# Patient Record
Sex: MALE | Race: OTHER | HISPANIC OR LATINO | Employment: FULL TIME | URBAN - METROPOLITAN AREA
[De-identification: names, ages, dates, MRNs, and addresses within clinical notes are randomized per-mention and may not be internally consistent; named-entity substitution may affect disease eponyms.]

---

## 2022-05-03 ENCOUNTER — OFFICE VISIT (OUTPATIENT)
Dept: GASTROENTEROLOGY | Facility: CLINIC | Age: 47
End: 2022-05-03
Payer: COMMERCIAL

## 2022-05-03 ENCOUNTER — TELEPHONE (OUTPATIENT)
Dept: GASTROENTEROLOGY | Facility: CLINIC | Age: 47
End: 2022-05-03

## 2022-05-03 VITALS
SYSTOLIC BLOOD PRESSURE: 122 MMHG | DIASTOLIC BLOOD PRESSURE: 78 MMHG | BODY MASS INDEX: 25.55 KG/M2 | WEIGHT: 159 LBS | HEIGHT: 66 IN | HEART RATE: 96 BPM

## 2022-05-03 DIAGNOSIS — Z87.19 HISTORY OF REPAIR OF HIATAL HERNIA: Primary | ICD-10-CM

## 2022-05-03 DIAGNOSIS — Z12.11 SCREENING FOR COLON CANCER: ICD-10-CM

## 2022-05-03 DIAGNOSIS — Z98.890 HISTORY OF REPAIR OF HIATAL HERNIA: Primary | ICD-10-CM

## 2022-05-03 PROCEDURE — 99213 OFFICE O/P EST LOW 20 MIN: CPT | Performed by: REGISTERED NURSE

## 2022-05-03 RX ORDER — FEXOFENADINE HCL 180 MG/1
180 TABLET ORAL DAILY
COMMUNITY

## 2022-05-03 NOTE — PROGRESS NOTES
[address] [facility] - Outpatient Consultation  Sandra Myers 49 y o  male MRN: [id number]  Encounter: [id number]    ASSESSMENT AND PLAN:      1  History of repair of hiatal hernia  Status post gastric fundal application 50/18/0258  Recovering nicely  He is already weaned off of his Nexium  He is eating multiple small meals daily  Slight bloating however he will take an Khloe-[place] or Gas-X and it will resolve  Denies any acid reflux, nausea or vomiting  2  Screening for colon cancer  No prior colonoscopy  Average risk for colon neoplasm    - Colonoscopy; Future  - Sod Picosulfate-Mag Ox-Cit Acd 10-3 5-12 MG-GM -GM/160ML SOLN; Take 160 mL by mouth once for 1 dose Take 160 mL by mouth once for 1 dose  Dispense: 320 mL; Refill: 0      Followup Appointment:  2 months  ______________________________________________________________________    Chief Complaint   Patient presents with    stomach not feeling well with eating       HPI:   Sandra Myers is a 49y o  year old male who presents for follow-up after hiatal hernia surgery  He is status post gastric fundal application for hiatal hernia chronic reflux  Surgery was on about 02/03/2022  Patient had a surgery done in [place]  He was on Nexium twice daily and then wean himself off and is no longer taking  He is eating multiple small meals daily  He does admit to intermittent bloating at times however this is much improved even from a month ago  He seems to be recovering well  He denies any nausea or vomiting  He has lost about 10 lb however he is eating a significant amount less than what he was  Denies any changes in bowel habits any blood in the stools      Historical Information   Past Medical History:   Diagnosis Date    Allergies     GERD (gastroesophageal reflux disease)      Past Surgical History:   Procedure Laterality Date    HERNIA REPAIR      UPPER GASTROINTESTINAL ENDOSCOPY       Social History Substance and Sexual Activity   Alcohol Use Not Currently     Social History     Substance and Sexual Activity   Drug Use Not on file     Social History     Tobacco Use   Smoking Status Never Smoker   Smokeless Tobacco Never Used     Family History   Problem Relation Age of Onset    Colon polyps Neg Hx     Colon cancer Neg Hx        Meds/Allergies     Current Outpatient Medications:     fexofenadine (ALLEGRA) 180 MG tablet    Sod Picosulfate-Mag Ox-Cit Acd 10-3 5-12 MG-GM -GM/160ML SOLN    Allergies   Allergen Reactions    Penicillins Rash       PHYSICAL EXAM:    Blood pressure 122/78, pulse 96, height 5' 6" (1 676 m), weight 72 1 kg (159 lb)  Body mass index is 25 66 kg/m²  General Appearance: NAD, cooperative, alert  Eyes: Anicteric, PERRLA, EOMI  ENT:  Normocephalic, atraumatic, normal mucosa  Neck:  Supple, symmetrical, trachea midline,   Resp:  Clear to auscultation bilaterally; no rales, rhonchi or wheezing; respirations unlabored   CV:  S1 S2, Regular rate and rhythm; no murmur, rub, or gallop  GI:  Soft, non-tender, non-distended; normal bowel sounds; no masses, no organomegaly   Rectal: Deferred  Musculoskeletal: No cyanosis, clubbing or edema  Normal ROM  Skin:  No jaundice, rashes, or lesions   Heme/Lymph: No palpable cervical lymphadenopathy  Psych: Normal affect, good eye contact  Neuro: No gross deficits, AAOx3    Lab Results:   No results found for: WBC, HGB, HCT, MCV, PLT  No results found for: NA, K, CL, CO2, ANIONGAP, BUN, CREATININE, GLUCOSE, GLUF, CALCIUM, CORRECTEDCA, AST, ALT, ALKPHOS, PROT, BILITOT, EGFR  No results found for: IRON, TIBC, FERRITIN  No results found for: LIPASE    Radiology Results:   No results found  REVIEW OF SYSTEMS:    CONSTITUTIONAL: Denies any fever, chills, rigors, and weight loss  HEENT: No earache or tinnitus  Denies hearing loss or visual disturbances  CARDIOVASCULAR: No chest pain or palpitations     RESPIRATORY: Denies any cough, hemoptysis, shortness of breath or dyspnea on exertion  GASTROINTESTINAL: As noted in the History of Present Illness  GENITOURINARY: No problems with urination  Denies any hematuria or dysuria  NEUROLOGIC: No dizziness or vertigo, denies headaches  MUSCULOSKELETAL: Denies any muscle or joint pain  SKIN: Denies skin rashes or itching  ENDOCRINE: Denies excessive thirst  Denies intolerance to heat or cold  PSYCHOSOCIAL: Denies depression or anxiety  Denies any recent memory loss

## 2022-05-03 NOTE — TELEPHONE ENCOUNTER
Patient will call back to schedule colon  He is calling back with new insurance information  I went over assessment with patient and gave him a colon packet with instructions   georges

## 2022-05-26 NOTE — TELEPHONE ENCOUNTER
Brianna has been contacted to schedule colon ordered from last ov with no response-please advise   Thank you

## 2022-11-26 ENCOUNTER — APPOINTMENT (EMERGENCY)
Dept: RADIOLOGY | Facility: HOSPITAL | Age: 47
End: 2022-11-26

## 2022-11-26 ENCOUNTER — HOSPITAL ENCOUNTER (EMERGENCY)
Facility: HOSPITAL | Age: 47
Discharge: HOME | End: 2022-11-26
Attending: EMERGENCY MEDICINE

## 2022-11-26 VITALS
HEIGHT: 66 IN | BODY MASS INDEX: 25.55 KG/M2 | DIASTOLIC BLOOD PRESSURE: 75 MMHG | SYSTOLIC BLOOD PRESSURE: 136 MMHG | RESPIRATION RATE: 24 BRPM | WEIGHT: 159 LBS | TEMPERATURE: 97.4 F | HEART RATE: 72 BPM | OXYGEN SATURATION: 98 %

## 2022-11-26 DIAGNOSIS — Z98.890 STATUS POST NISSEN FUNDOPLICATION: ICD-10-CM

## 2022-11-26 DIAGNOSIS — R00.2 PALPITATIONS: ICD-10-CM

## 2022-11-26 DIAGNOSIS — R07.89 ATYPICAL CHEST PAIN: Primary | ICD-10-CM

## 2022-11-26 LAB
2HR DELTA HS TROPONIN: 0 NG/L
ANION GAP SERPL CALCULATED.3IONS-SCNC: 9 MMOL/L (ref 4–13)
ATRIAL RATE: 75 BPM
BASOPHILS # BLD AUTO: 0.04 THOUSANDS/ÂΜL (ref 0–0.1)
BASOPHILS NFR BLD AUTO: 1 % (ref 0–1)
BUN SERPL-MCNC: 14 MG/DL (ref 5–25)
CALCIUM SERPL-MCNC: 9 MG/DL (ref 8.3–10.1)
CARDIAC TROPONIN I PNL SERPL HS: 3 NG/L
CARDIAC TROPONIN I PNL SERPL HS: 3 NG/L
CHLORIDE SERPL-SCNC: 105 MMOL/L (ref 96–108)
CO2 SERPL-SCNC: 27 MMOL/L (ref 21–32)
CREAT SERPL-MCNC: 0.77 MG/DL (ref 0.6–1.3)
EOSINOPHIL # BLD AUTO: 0.1 THOUSAND/ÂΜL (ref 0–0.61)
EOSINOPHIL NFR BLD AUTO: 2 % (ref 0–6)
ERYTHROCYTE [DISTWIDTH] IN BLOOD BY AUTOMATED COUNT: 11.9 % (ref 11.6–15.1)
GFR SERPL CREATININE-BSD FRML MDRD: 107 ML/MIN/1.73SQ M
GLUCOSE SERPL-MCNC: 94 MG/DL (ref 65–140)
HCT VFR BLD AUTO: 47 % (ref 36.5–49.3)
HGB BLD-MCNC: 15.7 G/DL (ref 12–17)
IMM GRANULOCYTES # BLD AUTO: 0.02 THOUSAND/UL (ref 0–0.2)
IMM GRANULOCYTES NFR BLD AUTO: 0 % (ref 0–2)
LYMPHOCYTES # BLD AUTO: 1.9 THOUSANDS/ÂΜL (ref 0.6–4.47)
LYMPHOCYTES NFR BLD AUTO: 38 % (ref 14–44)
MCH RBC QN AUTO: 30.1 PG (ref 26.8–34.3)
MCHC RBC AUTO-ENTMCNC: 33.4 G/DL (ref 31.4–37.4)
MCV RBC AUTO: 90 FL (ref 82–98)
MONOCYTES # BLD AUTO: 0.37 THOUSAND/ÂΜL (ref 0.17–1.22)
MONOCYTES NFR BLD AUTO: 8 % (ref 4–12)
NEUTROPHILS # BLD AUTO: 2.53 THOUSANDS/ÂΜL (ref 1.85–7.62)
NEUTS SEG NFR BLD AUTO: 51 % (ref 43–75)
NRBC BLD AUTO-RTO: 0 /100 WBCS
P AXIS: 42 DEGREES
PLATELET # BLD AUTO: 252 THOUSANDS/UL (ref 149–390)
PMV BLD AUTO: 9.2 FL (ref 8.9–12.7)
POTASSIUM SERPL-SCNC: 3.8 MMOL/L (ref 3.5–5.3)
PR INTERVAL: 134 MS
QRS AXIS: 44 DEGREES
QRSD INTERVAL: 88 MS
QT INTERVAL: 382 MS
QTC INTERVAL: 426 MS
RBC # BLD AUTO: 5.21 MILLION/UL (ref 3.88–5.62)
SODIUM SERPL-SCNC: 141 MMOL/L (ref 135–147)
T WAVE AXIS: 41 DEGREES
VENTRICULAR RATE: 75 BPM
WBC # BLD AUTO: 4.96 THOUSAND/UL (ref 4.31–10.16)

## 2022-11-26 RX ORDER — CALCIUM CARBONATE 200(500)MG
500 TABLET,CHEWABLE ORAL DAILY PRN
Status: DISCONTINUED | OUTPATIENT
Start: 2022-11-26 | End: 2022-11-26 | Stop reason: HOSPADM

## 2022-11-26 RX ORDER — FAMOTIDINE 20 MG/1
20 TABLET, FILM COATED ORAL ONCE
Status: COMPLETED | OUTPATIENT
Start: 2022-11-26 | End: 2022-11-26

## 2022-11-26 RX ORDER — MAGNESIUM HYDROXIDE/ALUMINUM HYDROXICE/SIMETHICONE 120; 1200; 1200 MG/30ML; MG/30ML; MG/30ML
30 SUSPENSION ORAL ONCE
Status: COMPLETED | OUTPATIENT
Start: 2022-11-26 | End: 2022-11-26

## 2022-11-26 RX ORDER — LIDOCAINE HYDROCHLORIDE 20 MG/ML
15 SOLUTION OROPHARYNGEAL ONCE
Status: COMPLETED | OUTPATIENT
Start: 2022-11-26 | End: 2022-11-26

## 2022-11-26 RX ADMIN — CALCIUM CARBONATE (ANTACID) CHEW TAB 500 MG 500 MG: 500 CHEW TAB at 09:30

## 2022-11-26 RX ADMIN — FAMOTIDINE 20 MG: 20 TABLET, FILM COATED ORAL at 09:30

## 2022-11-26 RX ADMIN — LIDOCAINE HYDROCHLORIDE 15 ML: 20 SOLUTION ORAL; TOPICAL at 09:30

## 2022-11-26 RX ADMIN — ALUMINUM HYDROXIDE, MAGNESIUM HYDROXIDE, AND SIMETHICONE 30 ML: 200; 200; 20 SUSPENSION ORAL at 09:30

## 2022-11-26 NOTE — ED PROVIDER NOTES
History  Chief Complaint   Patient presents with   • Chest Pain     Patient presents to ED with "weight" sensation on his chest that began around 0700 this morning  Patient reports increased bloating after eating, worse than usual       48-year-old male with no significant past medical history, GERD, hiatal hernia repair approximately 8 months ago presents with chest heaviness which he states started approximately 07:00  Also has some mild epigastric discomfort the patient reports has been going on since a recent bout salmonella diarrhea approximately 2 weeks ago  However, he did have a nissen fundoplication performed in [place] approx 8 months ago and states that he also has been having sharp epigastric pain since that time  He believes the epigastric discomfort is somewhat residual from both of these things but also uncertain if this is representative of palpitations  He also notes is feeling heaviness moving up from is lower extremities to his torso but no associated weakness  Associates this with generalized malaise  No fevers  Nausea but no vomiting and no diarrhea  No recent exposures to illnesses  Family history of cardiac disease in both parents  Prior to Admission Medications   Prescriptions Last Dose Informant Patient Reported? Taking?   fexofenadine (ALLEGRA) 180 MG tablet   Yes No   Sig: Take 180 mg by mouth daily      Facility-Administered Medications: None       Past Medical History:   Diagnosis Date   • Allergies    • GERD (gastroesophageal reflux disease)        Past Surgical History:   Procedure Laterality Date   • HERNIA REPAIR     • UPPER GASTROINTESTINAL ENDOSCOPY         Family History   Problem Relation Age of Onset   • Colon polyps Neg Hx    • Colon cancer Neg Hx      I have reviewed and agree with the history as documented      E-Cigarette/Vaping   • E-Cigarette Use Never User      E-Cigarette/Vaping Substances   • Nicotine No    • THC No    • CBD No • Flavoring No    • Other No    • Unknown No      Social History     Tobacco Use   • Smoking status: Never   • Smokeless tobacco: Never   Vaping Use   • Vaping Use: Never used   Substance Use Topics   • Alcohol use: Not Currently       Review of Systems   Constitutional: Positive for chills and fatigue  Negative for diaphoresis and fever  Respiratory: Positive for chest tightness  Negative for wheezing and stridor  Cardiovascular: Negative for chest pain  Gastrointestinal: Negative for abdominal distention, abdominal pain, anal bleeding, diarrhea, nausea, rectal pain and vomiting  Endocrine: Positive for cold intolerance  Physical Exam  Physical Exam  Vitals (Afebrile and 99% on room air) and nursing note reviewed  Constitutional:       General: He is not in acute distress  Appearance: He is well-developed  HENT:      Head: Normocephalic and atraumatic  Eyes:      Conjunctiva/sclera: Conjunctivae normal    Cardiovascular:      Rate and Rhythm: Normal rate and regular rhythm  Heart sounds: No murmur heard  Pulmonary:      Effort: Pulmonary effort is normal  No respiratory distress  Breath sounds: Normal breath sounds  Abdominal:      Palpations: Abdomen is soft  Tenderness: There is no abdominal tenderness  There is no guarding or rebound  Negative signs include McBurney's sign and psoas sign  Comments: Healed abdominal laparoscopy scars   Musculoskeletal:         General: No swelling  Cervical back: Neck supple  Skin:     General: Skin is warm and dry  Capillary Refill: Capillary refill takes less than 2 seconds  Neurological:      Mental Status: He is alert     Psychiatric:         Mood and Affect: Mood normal          Vital Signs  ED Triage Vitals   Temperature Pulse Respirations Blood Pressure SpO2   11/26/22 0802 11/26/22 0802 11/26/22 0802 11/26/22 0804 11/26/22 0802   (!) 97 4 °F (36 3 °C) 71 18 136/75 99 %      Temp Source Heart Rate Source Patient Position - Orthostatic VS BP Location FiO2 (%)   11/26/22 0802 11/26/22 0802 11/26/22 0802 11/26/22 0802 --   Temporal Monitor Lying Left arm       Pain Score       --                  Vitals:    11/26/22 0804 11/26/22 0830 11/26/22 0900 11/26/22 0915   BP: 136/75      Pulse:  68 62 72   Patient Position - Orthostatic VS:             Visual Acuity      ED Medications  Medications   aluminum-magnesium hydroxide-simethicone (MYLANTA) oral suspension 30 mL (30 mL Oral Given 11/26/22 0930)   famotidine (PEPCID) tablet 20 mg (20 mg Oral Given 11/26/22 0930)   Lidocaine Viscous HCl (XYLOCAINE) 2 % mucosal solution 15 mL (15 mL Swish & Swallow Given 11/26/22 0930)       Diagnostic Studies  Results Reviewed     Procedure Component Value Units Date/Time    HS Troponin I 2hr [[id number]]  (Normal) Collected: 11/26/22 1016    Lab Status: Final result Specimen: Blood from Arm, Right Updated: 11/26/22 1109     hs TnI 2hr 3 ng/L      Delta 2hr hsTnI 0 ng/L     FLU/COVID - if FLU clinically relevant [[id number]] Collected: 11/26/22 0918    Lab Status:  In process Specimen: Nares from Nose Updated: 11/26/22 0920    HS Troponin 0hr (reflex protocol) [[id number]]  (Normal) Collected: 11/26/22 0812    Lab Status: Final result Specimen: Blood from Arm, Right Updated: 11/26/22 0849     hs TnI 0hr 3 ng/L     Basic metabolic panel [[id number]] Collected: 11/26/22 0812    Lab Status: Final result Specimen: Blood from Arm, Right Updated: 11/26/22 0836     Sodium 141 mmol/L      Potassium 3 8 mmol/L      Chloride 105 mmol/L      CO2 27 mmol/L      ANION GAP 9 mmol/L      BUN 14 mg/dL      Creatinine 0 77 mg/dL      Glucose 94 mg/dL      Calcium 9 0 mg/dL      eGFR 107 ml/min/1 73sq m     Narrative:      [place] guidelines for Chronic Kidney Disease (CKD):   •  Stage 1 with normal or high GFR (GFR > 90 mL/min/1 73 square meters)  •  Stage 2 Mild CKD (GFR = 60-89 mL/min/1 73 square meters)  •  Stage 3A Moderate CKD (GFR = 45-59 mL/min/1 73 square meters)  •  Stage 3B Moderate CKD (GFR = 30-44 mL/min/1 73 square meters)  •  Stage 4 Severe CKD (GFR = 15-29 mL/min/1 73 square meters)  •  Stage 5 End Stage CKD (GFR <15 mL/min/1 73 square meters)  Note: GFR calculation is accurate only with a steady state creatinine    CBC and differential [[id number]] Collected: 11/26/22 0812    Lab Status: Final result Specimen: Blood from Arm, Right Updated: 11/26/22 0825     WBC 4 96 Thousand/uL      RBC 5 21 Million/uL      Hemoglobin 15 7 g/dL      Hematocrit 47 0 %      MCV 90 fL      MCH 30 1 pg      MCHC 33 4 g/dL      RDW 11 9 %      MPV 9 2 fL      Platelets 889 Thousands/uL      nRBC 0 /100 WBCs      Neutrophils Relative 51 %      Immat GRANS % 0 %      Lymphocytes Relative 38 %      Monocytes Relative 8 %      Eosinophils Relative 2 %      Basophils Relative 1 %      Neutrophils Absolute 2 53 Thousands/µL      Immature Grans Absolute 0 02 Thousand/uL      Lymphocytes Absolute 1 90 Thousands/µL      Monocytes Absolute 0 37 Thousand/µL      Eosinophils Absolute 0 10 Thousand/µL      Basophils Absolute 0 04 Thousands/µL                  X-ray chest 1 view portable   Final Result by Jabari Byrne MD (11/26 2454)      No acute cardiopulmonary disease                    Workstation performed: [id number]                    Procedures  ECG 12 Lead Documentation Only    Date/Time: 11/26/2022 8:03 AM  Performed by: Ada Briscoe PA-C  Authorized by: Ada Briscoe PA-C     Indications / Diagnosis:  Chest pain  ECG reviewed by me, the ED Provider: yes    Patient location:  ED  Previous ECG:     Comparison to cardiac monitor: Yes    Interpretation:     Interpretation: normal    Rate:     ECG rate:  75    ECG rate assessment: normal    Rhythm:     Rhythm: sinus rhythm    Ectopy:     Ectopy: none    QRS:     QRS axis:  Normal  Conduction:     Conduction: normal    ST segments:     ST segments:  Normal  T waves:     T waves: normal               ED Course  ED Course as of 11/26/22 1635   Sat Nov 26, 2022   0369 Workup initiated   0825 EKG and chest x-ray normal   0909 Initial troponin less than 5  Consideration for symptoms started 07:00 will proceed with 2 hour troponin at 08:49 and if negative will discharge to home  1106 2nd trop still in process  Pt pain free     1120 Did discuss results with the patient with negative delta and low value HS troponins  Pt agreeable to plan for OP follow up w/ both cardiology and gastroenterology for respective workups for possible palpitations v  Possible gastric etiology from prior nissen fundoplication and for further need for evaluation w/ EGD  MDM  Number of Diagnoses or Management Options  Atypical chest pain: new and requires workup  Palpitations: new and requires workup  Status post Nissen fundoplication: established and improving     Amount and/or Complexity of Data Reviewed  Clinical lab tests: ordered and reviewed  Tests in the radiology section of CPT®: ordered and reviewed      Discussed return emergency department for any newly developing or worsening signs or symptoms  Patient understood all instructions prior to discharge and plan agreed upon by patient and myself  Disposition  Final diagnoses:   Atypical chest pain   Palpitations   Status post Nissen fundoplication     Time reflects when diagnosis was documented in both MDM as applicable and the Disposition within this note     Time User Action Codes Description Comment    11/26/2022  8:26 AM Osito Becket Add [R07 89] Atypical chest pain     11/26/2022 11:25 AM Osito Becket Add [R00 2] Palpitations     11/26/2022 11:25 AM Osito Guzmanet Add [L59 733] Status post [facility] fundoplication       ED Disposition     ED Disposition   Discharge    Condition   Stable    Date/Time   Sat Nov 26, 2022 11:24 AM    Comment   Momo Houser discharge to home/self care  Follow-up Information     Follow up With Specialties Details Why Contact Info Additional 450 Alexander Calabrese DO Internal Medicine Call today Call to schedule follow-up appointment [address]       Efrem Samayoa MD Cardiology, Multidisciplinary Call today Call schedule follow-up 611 [facility] [phone] Oc Newton MD Gastroenterology Call today Call schedule follow-up appointment for status post [facility] fundoplication 53 [facility] 30  [address] Emergency Department Emergency Medicine Go to  If symptoms worsen [address] [address]  [phone] [address] Emergency Department, [address] [place], [facility]          Discharge Medication List as of 11/26/2022 11:28 AM      CONTINUE these medications which have NOT CHANGED    Details   fexofenadine (ALLEGRA) 180 MG tablet Take 180 mg by mouth daily, Historical Med                 PDMP Review     None          ED Provider  Electronically Signed by           Ahsan Avendano PA-C  11/26/22 9058

## 2022-11-28 LAB
FLUAV RNA RESP QL NAA+PROBE: NEGATIVE
FLUBV RNA RESP QL NAA+PROBE: NEGATIVE
SARS-COV-2 RNA RESP QL NAA+PROBE: NEGATIVE

## 2024-09-12 ENCOUNTER — OFFICE VISIT (OUTPATIENT)
Dept: GASTROENTEROLOGY | Facility: CLINIC | Age: 49
End: 2024-09-12
Payer: COMMERCIAL

## 2024-09-12 VITALS
WEIGHT: 169 LBS | SYSTOLIC BLOOD PRESSURE: 130 MMHG | BODY MASS INDEX: 27.16 KG/M2 | HEIGHT: 66 IN | DIASTOLIC BLOOD PRESSURE: 80 MMHG

## 2024-09-12 DIAGNOSIS — R14.0 BLOATING: ICD-10-CM

## 2024-09-12 DIAGNOSIS — Z87.19 HX OF GASTROESOPHAGEAL REFLUX (GERD): ICD-10-CM

## 2024-09-12 DIAGNOSIS — R11.0 NAUSEA: Primary | ICD-10-CM

## 2024-09-12 DIAGNOSIS — Z12.11 SCREENING FOR COLON CANCER: ICD-10-CM

## 2024-09-12 DIAGNOSIS — Z98.890 HISTORY OF REPAIR OF HIATAL HERNIA: ICD-10-CM

## 2024-09-12 DIAGNOSIS — Z87.19 HISTORY OF REPAIR OF HIATAL HERNIA: ICD-10-CM

## 2024-09-12 PROCEDURE — 99213 OFFICE O/P EST LOW 20 MIN: CPT | Performed by: NURSE PRACTITIONER

## 2024-09-12 RX ORDER — POLYETHYLENE GLYCOL 3350 17 G/17G
238 POWDER, FOR SOLUTION ORAL ONCE
Qty: 238 G | Refills: 0 | Status: SHIPPED | OUTPATIENT
Start: 2024-09-12 | End: 2024-09-12

## 2024-09-12 NOTE — PROGRESS NOTES
[facility] - Outpatient Follow-up Note  Erick Green 49 y.o. male MRN: [id number]  Encounter: [id number]    ASSESSMENT AND PLAN:      1. Nausea  2. Bloating  Does note occasional nausea and states he does have episodes of bloating as well.  Discussed with patient he is a fast eater and noted he needs to pause and/or slow down while eating for he can be also swallowing air.  In patient's current setting will be doing surveillance EGD for hiatal hernia repair.  If patient's symptoms persist or worsen may consider gastric emptying scan to rule out gastroparesis.    -Discussed with patient trialing IBgard OTC as directed  - EGD scheduled at [facility]; Future    3. History of repair of hiatal hernia  Patient states he underwent hiatal hernia repair in the [facility] around 2/2022.  Patient states he was advised to have follow-up EGD.    - EGD; Future    4. Hx of gastroesophageal reflux (GERD)  Patient states since having hiatal hernia repair he has not required to take any acid reflux symptoms and denies any symptoms or breakthrough symptoms at this time.    5. Screening for colon cancer  Patient has not had colonoscopy to date.  He denies any family history of colon cancer.    - Colonoscopy scheduled at [facility]; Future  - polyethylene glycol (MiraLax) 17 GM/SCOOP powder; Take 238 g by mouth once for 1 dose Take 238 g my mouth. Use as directed  Dispense: 238 g; Refill: 0      Followup Appointment: After procedures with Dr. Ibrahim  ______________________________________________________________________      HPI:    49-year-old male with past medical history of anal hernia repair (repaired in [facility] 2/2022), and previous history of acid reflux symptoms.  Patient presents for EGD follow-up gastric fundoplication surgery and consult for colonoscopy.  On exam, patient states he has occasional nausea without vomiting.  He denies any acid reflux symptoms at this time.  Patient states he has  "mostly increased bloating sensation to abdomen.  Patient does note episodes of constipation in the past.  Discussed adequate fiber and fluids.  Patient does note that he does eat rather quickly.  Also discussed dietary discretion.  Dietary discretion.  Discussed with patient trialing IBgard OTC for increased bloating.  Patient states he is having daily normal bowel movements.  Denies hematochezia or melena.  Non-smoker, denies EtOH use.  States his weight is stable.  Patient has not had a colonoscopy to date and he denies any family history of colon cancer.    Historical Information   Past Medical History:   Diagnosis Date    Allergies     GERD (gastroesophageal reflux disease)      Past Surgical History:   Procedure Laterality Date    HERNIA REPAIR      UPPER GASTROINTESTINAL ENDOSCOPY       Social History     Substance and Sexual Activity   Alcohol Use Not Currently     Social History     Substance and Sexual Activity   Drug Use Not on file     Social History     Tobacco Use   Smoking Status Never   Smokeless Tobacco Never     Family History   Problem Relation Age of Onset    Colon polyps Neg Hx     Colon cancer Neg Hx          Current Outpatient Medications:     fexofenadine (ALLEGRA) 180 MG tablet    polyethylene glycol (MiraLax) 17 GM/SCOOP powder  Allergies   Allergen Reactions    Penicillins Rash     Reviewed medications and allergies and updated as indicated    PHYSICAL EXAM:    Blood pressure 130/80, height 5' 6\" (1.676 m), weight 76.7 kg (169 lb). Body mass index is 27.28 kg/m².    Normal exam    General Appearance: NAD, cooperative, alert  Eyes: Anicteric, PERRLA, EOMI  ENT:  Normocephalic, atraumatic, normal mucosa.    Neck:  Supple, symmetrical, trachea midline  Resp:  Clear to auscultation bilaterally; no rales, rhonchi or wheezing; respirations unlabored   CV:  S1 S2, Regular rate and rhythm; no murmur, rub, or gallop.  GI:  Soft, non-tender, non-distended; normal bowel sounds; no masses, no " "organomegaly   Rectal: Deferred  Musculoskeletal: No cyanosis, clubbing or edema. Normal ROM.  Skin:  No jaundice, rashes, or lesions   Heme/Lymph: No palpable cervical lymphadenopathy  Psych: Normal affect, good eye contact  Neuro: No gross deficits, AAOx3    Lab Results:   Lab Results   Component Value Date    WBC 4.96 11/26/2022    HGB 15.7 11/26/2022    HCT 47.0 11/26/2022    MCV 90 11/26/2022     11/26/2022     Lab Results   Component Value Date    K 3.8 11/26/2022     11/26/2022    CO2 27 11/26/2022    BUN 14 11/26/2022    CREATININE 0.77 11/26/2022    CALCIUM 9.0 11/26/2022    EGFR 107 11/26/2022     No results found for: \"IRON\", \"TIBC\", \"FERRITIN\"  No results found for: \"LIPASE\"    Radiology Results:   No results found.  "

## 2024-09-12 NOTE — PATIENT INSTRUCTIONS
Dietary discretion, monitor trigger foods.   Slow down with eating, pause, put utensil down.    Bloating: [place] OTC, as directed

## 2024-10-03 ENCOUNTER — ANESTHESIA (OUTPATIENT)
Dept: ANESTHESIOLOGY | Facility: AMBULATORY SURGERY CENTER | Age: 49
End: 2024-10-03

## 2024-10-03 ENCOUNTER — ANESTHESIA EVENT (OUTPATIENT)
Dept: ANESTHESIOLOGY | Facility: AMBULATORY SURGERY CENTER | Age: 49
End: 2024-10-03

## 2024-10-09 ENCOUNTER — TELEPHONE (OUTPATIENT)
Dept: GASTROENTEROLOGY | Facility: CLINIC | Age: 49
End: 2024-10-09

## 2024-10-09 NOTE — TELEPHONE ENCOUNTER
Procedure confirmed  Colonoscopy/Endoscopy     Via: Spoke with patient.      Instructions given: Given to Patient at Visit     Prep Given: Miralax/Dulcolax    Call the office if there are any questions.

## 2024-10-17 ENCOUNTER — HOSPITAL ENCOUNTER (OUTPATIENT)
Dept: GASTROENTEROLOGY | Facility: AMBULATORY SURGERY CENTER | Age: 49
Discharge: HOME | End: 2024-10-17
Payer: COMMERCIAL

## 2024-10-17 ENCOUNTER — ANESTHESIA EVENT (OUTPATIENT)
Dept: GASTROENTEROLOGY | Facility: AMBULATORY SURGERY CENTER | Age: 49
End: 2024-10-17

## 2024-10-17 ENCOUNTER — ANESTHESIA (OUTPATIENT)
Dept: GASTROENTEROLOGY | Facility: AMBULATORY SURGERY CENTER | Age: 49
End: 2024-10-17

## 2024-10-17 VITALS
RESPIRATION RATE: 17 BRPM | DIASTOLIC BLOOD PRESSURE: 77 MMHG | HEART RATE: 59 BPM | WEIGHT: 169 LBS | SYSTOLIC BLOOD PRESSURE: 136 MMHG | HEIGHT: 66 IN | OXYGEN SATURATION: 100 % | TEMPERATURE: 98 F | BODY MASS INDEX: 27.16 KG/M2

## 2024-10-17 DIAGNOSIS — Z87.19 HISTORY OF REPAIR OF HIATAL HERNIA: ICD-10-CM

## 2024-10-17 DIAGNOSIS — Z98.890 HISTORY OF REPAIR OF HIATAL HERNIA: ICD-10-CM

## 2024-10-17 DIAGNOSIS — Z12.11 SCREENING FOR COLON CANCER: ICD-10-CM

## 2024-10-17 DIAGNOSIS — R11.0 NAUSEA: ICD-10-CM

## 2024-10-17 DIAGNOSIS — R14.0 BLOATING: ICD-10-CM

## 2024-10-17 PROCEDURE — 88342 IMHCHEM/IMCYTCHM 1ST ANTB: CPT | Performed by: PATHOLOGY

## 2024-10-17 PROCEDURE — G0121 COLON CA SCRN NOT HI RSK IND: HCPCS | Performed by: INTERNAL MEDICINE

## 2024-10-17 PROCEDURE — 43239 EGD BIOPSY SINGLE/MULTIPLE: CPT | Performed by: INTERNAL MEDICINE

## 2024-10-17 PROCEDURE — 88305 TISSUE EXAM BY PATHOLOGIST: CPT | Performed by: PATHOLOGY

## 2024-10-17 RX ORDER — PROPOFOL 10 MG/ML
INJECTION, EMULSION INTRAVENOUS AS NEEDED
Status: DISCONTINUED | OUTPATIENT
Start: 2024-10-17 | End: 2024-10-17

## 2024-10-17 RX ORDER — SODIUM CHLORIDE, SODIUM LACTATE, POTASSIUM CHLORIDE, CALCIUM CHLORIDE 600; 310; 30; 20 MG/100ML; MG/100ML; MG/100ML; MG/100ML
75 INJECTION, SOLUTION INTRAVENOUS CONTINUOUS
Status: DISCONTINUED | OUTPATIENT
Start: 2024-10-17 | End: 2024-10-21 | Stop reason: HOSPADM

## 2024-10-17 RX ADMIN — SODIUM CHLORIDE, SODIUM LACTATE, POTASSIUM CHLORIDE, CALCIUM CHLORIDE 75 ML/HR: 600; 310; 30; 20 INJECTION, SOLUTION INTRAVENOUS at 09:23

## 2024-10-17 RX ADMIN — PROPOFOL 100 MG: 10 INJECTION, EMULSION INTRAVENOUS at 09:34

## 2024-10-17 RX ADMIN — PROPOFOL 100 MG: 10 INJECTION, EMULSION INTRAVENOUS at 09:43

## 2024-10-17 RX ADMIN — SODIUM CHLORIDE, SODIUM LACTATE, POTASSIUM CHLORIDE, CALCIUM CHLORIDE: 600; 310; 30; 20 INJECTION, SOLUTION INTRAVENOUS at 09:54

## 2024-10-17 RX ADMIN — PROPOFOL 50 MG: 10 INJECTION, EMULSION INTRAVENOUS at 09:36

## 2024-10-17 RX ADMIN — PROPOFOL 100 MG: 10 INJECTION, EMULSION INTRAVENOUS at 09:50

## 2024-10-17 NOTE — ANESTHESIA POSTPROCEDURE EVALUATION
Post-Op Assessment Note    CV Status:  Stable  Pain Score: 0    Pain management: adequate       Mental Status:  Alert and awake   Hydration Status:  Euvolemic   PONV Controlled:  Controlled   Airway Patency:  Patent     Post Op Vitals Reviewed: Yes    No anethesia notable event occurred.    Staff: CRNA           Last Filed PACU Vitals:  Vitals Value Taken Time   Temp 98 955   Pulse 66    BP 86/53    Resp 16    SpO2 98        Modified Cruz:  Activity: 2 (10/17/2024  9:09 AM)  Respiration: 2 (10/17/2024  9:09 AM)  Circulation: 2 (10/17/2024  9:09 AM)  Consciousness: 2 (10/17/2024  9:09 AM)  Oxygen Saturation: 2 (10/17/2024  9:09 AM)  Modified Cruz Score: 10 (10/17/2024  9:09 AM)

## 2024-10-17 NOTE — ANESTHESIA PREPROCEDURE EVALUATION
Procedure:  COLONOSCOPY  EGD    Relevant Problems   GI/HEPATIC   (+) GERD (gastroesophageal reflux disease)        Physical Exam    Airway    Mallampati score: II  TM Distance: >3 FB  Neck ROM: full     Dental   No notable dental hx     Cardiovascular      Pulmonary      Other Findings        Anesthesia Plan  ASA Score- 2     Anesthesia Type- IV sedation with anesthesia with ASA Monitors.         Additional Monitors:     Airway Plan:            Plan Factors-    Chart reviewed.    Patient summary reviewed.    Patient is not a current smoker.              Induction- intravenous.    Postoperative Plan-         Informed Consent- Anesthetic plan and risks discussed with patient.  I personally reviewed this patient with the CRNA. Discussed and agreed on the Anesthesia Plan with the CRNA..

## 2024-10-17 NOTE — PROGRESS NOTES
B/p was running low in the upper 70's but has returned to baseline with B/p.  Pt denies dizziness or lightheadedness.

## 2024-10-17 NOTE — ANESTHESIA POSTPROCEDURE EVALUATION
Post-Op Assessment Note    CV Status:  Stable  Pain Score: 0    Pain management: adequate       Mental Status:  Alert and awake   Hydration Status:  Euvolemic and stable   PONV Controlled:  None   Airway Patency:  Patent     Post Op Vitals Reviewed: Yes    No anethesia notable event occurred.    Staff: Anesthesiologist           Last Filed PACU Vitals:  Vitals Value Taken Time   Temp     Pulse 59 10/17/24 1022   /77 10/17/24 1022   Resp 17 10/17/24 1022   SpO2 100 % 10/17/24 1022       Modified Cruz:  Activity: 2 (10/17/2024 10:13 AM)  Respiration: 2 (10/17/2024 10:13 AM)  Circulation: 2 (10/17/2024 10:13 AM)  Consciousness: 2 (10/17/2024 10:13 AM)  Oxygen Saturation: 2 (10/17/2024 10:13 AM)  Modified Cruz Score: 10 (10/17/2024 10:13 AM)

## 2024-10-17 NOTE — H&P
"History and Physical -  [facility]  Erick Green 49 y.o. male MRN: [id number]    HPI: Erick Green is a 49 y.o. male who presents for upper endoscopy due to bloating status post hiatal hernia repair and fundoplication as well as CRC screening colonoscopy    REVIEW OF SYSTEMS: Per the HPI, and otherwise unremarkable.    Historical Information   Past Medical History:   Diagnosis Date    Allergies     GERD (gastroesophageal reflux disease)     Hiatal hernia      Past Surgical History:   Procedure Laterality Date    HERNIA REPAIR      HIATAL HERNIA REPAIR      UPPER GASTROINTESTINAL ENDOSCOPY       Social History   Social History     Substance and Sexual Activity   Alcohol Use Not Currently     Social History     Substance and Sexual Activity   Drug Use Never     Social History     Tobacco Use   Smoking Status Never   Smokeless Tobacco Never     Family History   Problem Relation Age of Onset    Colon polyps Neg Hx     Colon cancer Neg Hx        Meds/Allergies       Current Outpatient Medications:     fexofenadine (ALLEGRA) 180 MG tablet    polyethylene glycol (MiraLax) 17 GM/SCOOP powder    Current Facility-Administered Medications:     lactated ringers infusion, 75 mL/hr, Intravenous, Continuous, 75 mL/hr at 10/17/24 0923    Allergies   Allergen Reactions    Penicillins Rash       Objective     /64   Pulse 63   Temp 98 °F (36.7 °C) (Temporal)   Resp 19   Ht 5' 6\" (1.676 m)   Wt 76.7 kg (169 lb)   SpO2 99%   BMI 27.28 kg/m²     PHYSICAL EXAM    General Appearance: NAD, cooperative, alert  Eyes: Anicteric  GI:  Soft, non-tender, non-distended; normal bowel sounds; no masses, no organomegaly   Rectal: Deferred until procedure  Musculoskeletal: No edema.  Skin:  No jaundice    ASSESSMENT/PLAN:  This is a 49 y.o. male here for upper endoscopy and colonoscopy, and he is stable and optimized for his procedure.        "

## 2024-10-18 ENCOUNTER — TELEPHONE (OUTPATIENT)
Age: 49
End: 2024-10-18

## 2024-10-18 NOTE — TELEPHONE ENCOUNTER
"Patient called the RX Refill Line. Message is being forwarded to the office.     Patient state the doctor was supposed to have sent an antibiotic over yesterday for an \"irritation in his stomach\". Patient stated that when he went to the pharmacy yesterday ([facility] in [place]) they told him they had nothing on file for him.    Please contact patient at   [phone]  "

## 2024-10-21 NOTE — TELEPHONE ENCOUNTER
Spoke with patient advised Dr. Ibrahim is waiting for biopsy results to determine if antibiotics are warranted.

## 2024-10-22 PROCEDURE — 88305 TISSUE EXAM BY PATHOLOGIST: CPT | Performed by: PATHOLOGY

## 2024-10-22 PROCEDURE — 88342 IMHCHEM/IMCYTCHM 1ST ANTB: CPT | Performed by: PATHOLOGY

## 2024-11-08 ENCOUNTER — OFFICE VISIT (OUTPATIENT)
Dept: GASTROENTEROLOGY | Facility: CLINIC | Age: 49
End: 2024-11-08
Payer: COMMERCIAL

## 2024-11-08 VITALS
WEIGHT: 167 LBS | SYSTOLIC BLOOD PRESSURE: 120 MMHG | DIASTOLIC BLOOD PRESSURE: 80 MMHG | BODY MASS INDEX: 26.84 KG/M2 | HEIGHT: 66 IN

## 2024-11-08 DIAGNOSIS — Z98.890 STATUS POST LAPAROSCOPIC FUNDOPLICATION: Primary | ICD-10-CM

## 2024-11-08 PROCEDURE — 99214 OFFICE O/P EST MOD 30 MIN: CPT | Performed by: INTERNAL MEDICINE

## 2024-11-08 RX ORDER — ESOMEPRAZOLE MAGNESIUM 40 MG/1
40 CAPSULE, DELAYED RELEASE ORAL DAILY
Qty: 90 CAPSULE | Refills: 0 | Status: SHIPPED | OUTPATIENT
Start: 2024-11-08

## 2024-11-08 NOTE — PROGRESS NOTES
[facility] - Outpatient Follow-up Note  Erick Green 49 y.o. male MRN: [id number]  Encounter: [id number]    ASSESSMENT AND PLAN:      1. Status post laparoscopic fundoplication  He does endorse some early satiety and postprandial pain, I will check a gastric emptying scan to see if there is delayed gastric emptying.  I have also put him on Nexium to see if any symptoms are reflux related, as his fundoplication does seem loose  - NM gastric emptying; Future  - esomeprazole (NexIUM) 40 MG capsule; Take 1 capsule (40 mg total) by mouth in the morning  Dispense: 90 capsule; Refill: 0      Follow up appointment: Pending gastric emptying scan    _______________________      Chief Complaint   Patient presents with    Follow-up     Pt had egd and colon in October       HPI:   Patient is a 49 y.o. male with a significant PMH of GERD with hiatal hernia status post hiatal hernia repair with fundoplication presenting for follow up regarding continued issues with postprandial epigastric pain, bloating, and fullness.  He states that his main symptoms of heartburn and regurgitation prior to getting his fundoplication are under control, however he feels bloating and pain after eating.  He is moving his bowels without issue.  He thinks that he did feel some of this prior to his antireflux surgery, but he is not sure.    Historical Information   Past Medical History:   Diagnosis Date    Allergies     GERD (gastroesophageal reflux disease)     Hiatal hernia      Past Surgical History:   Procedure Laterality Date    EGD AND COLONOSCOPY  10/17/2024    HERNIA REPAIR      HIATAL HERNIA REPAIR      UPPER GASTROINTESTINAL ENDOSCOPY       Social History     Substance and Sexual Activity   Alcohol Use Not Currently     Social History     Substance and Sexual Activity   Drug Use Never     Social History     Tobacco Use   Smoking Status Never   Smokeless Tobacco Never     Family History   Problem Relation  "Age of Onset    Colon polyps Neg Hx     Colon cancer Neg Hx          Current Outpatient Medications:     esomeprazole (NexIUM) 40 MG capsule    fexofenadine (ALLEGRA) 180 MG tablet  Allergies   Allergen Reactions    Penicillins Rash     Reviewed medications and allergies and updated as indicated    PHYSICAL EXAM:    Blood pressure 120/80, height 5' 6\" (1.676 m), weight 75.8 kg (167 lb). Body mass index is 26.95 kg/m².  General Appearance: NAD, cooperative, alert  Eyes: Anicteric  GI:  Soft, non-tender, non-distended; normal bowel sounds; no masses, no organomegaly   Rectal: Deferred  Musculoskeletal: No edema.  Skin:  No jaundice    Lab Results:   Lab Results   Component Value Date    WBC 4.96 11/26/2022    HGB 15.7 11/26/2022    MCV 90 11/26/2022     11/26/2022     Lab Results   Component Value Date    K 3.8 11/26/2022     11/26/2022    CO2 27 11/26/2022    BUN 14 11/26/2022    CREATININE 0.77 11/26/2022    CALCIUM 9.0 11/26/2022    EGFR 107 11/26/2022     No results found for: \"IRON\", \"TIBC\", \"FERRITIN\"  No results found for: \"LIPASE\"    Radiology Results:   Colonoscopy    Result Date: 10/17/2024  Narrative: Table formatting from the original result was not included. [facility] [address] [place] [id number] [phone] DATE OF SERVICE: 10/17/24 PHYSICIAN(S): Attending: Robert Ibrahim DO Fellow: No Staff Documented INDICATION: Screening for colon cancer POST-OP DIAGNOSIS: See the impression below. HISTORY: Prior colonoscopy: No prior colonoscopy. BOWEL PREPARATION: Miralax/Dulcolax PREPROCEDURE: Informed consent was obtained for the procedure, including sedation. Risks including but not limited to bleeding, infection, perforation, adverse drug reaction and aspiration were explained in detail. Also explained about less than 100% sensitivity with the exam and other alternatives. The patient was placed in the left lateral decubitus position. " Procedure: Colonoscopy DETAILS OF PROCEDURE: Patient was taken to the procedure room where a time out was performed to confirm correct patient and correct procedure. The patient underwent monitored anesthesia care, which was administered by an anesthesia professional. The patient's blood pressure, ECG and oxygen were monitored throughout the procedure. A digital rectal exam was performed. A perianal exam was performed. The scope was introduced through the anus and advanced to the cecum. Retroflexion was performed in the rectum. The quality of bowel preparation was evaluated using the Hiram Bowel Preparation Scale with scores of: right colon = 2, transverse colon = 2, left colon = 2. The total BBPS score was 6. Bowel prep was adequate. The patient experienced no blood loss. The procedure was not difficult. The patient tolerated the procedure well. There were no apparent adverse events. ANESTHESIA INFORMATION: ASA: II Anesthesia Type: IV Sedation with Anesthesia MEDICATIONS: lactated ringers infusion 400 mL*  *From user-documented volume (Totals for administrations occurring from 0926 to 0959 on 10/17/24) FINDINGS: Internal small (grade 1) hemorrhoids; no bleeding was identified EVENTS: Procedure Events Event Event Time ENDO SCOPE OUT TIME 10/17/2024  9:37 AM ENDO CECUM REACHED 10/17/2024  9:44 AM ENDO SCOPE OUT TIME 10/17/2024  9:54 AM SPECIMENS: ID Type Source Tests Collected by Time Destination 1 : gastric bx r/o h pylori Tissue Stomach TISSUE EXAM Robert Ibrahim DO 10/17/2024  9:38 AM  EQUIPMENT: Colonoscope -[id number]     Impression: Small (grade 1) hemorrhoids RECOMMENDATION: Repeat screening colonoscopy in 10 years, due: 10/15/2034   Robert Ibrahim DO     EGD    Result Date: 10/17/2024  Narrative: Table formatting from the original result was not included. [facility] [address] [place] [id number] [phone] DATE OF SERVICE: 10/17/24  PHYSICIAN(S): Attending: Robert Ibrahim DO Fellow: No Staff Documented INDICATION: Nausea, Bloating, History of repair of hiatal hernia POST-OP DIAGNOSIS: See the impression below. PREPROCEDURE: Informed consent was obtained for the procedure, including sedation.  Risks of perforation, hemorrhage, adverse drug reaction and aspiration were discussed. The patient was placed in the left lateral decubitus position. Patient was explained about the risks and benefits of the procedure. Risks including but not limited to bleeding, infection, and perforation were explained in detail. Also explained about less than 100% sensitivity with the exam and other alternatives. PROCEDURE: EGD DETAILS OF PROCEDURE: Patient was taken to the procedure room where a time out was performed to confirm correct patient and correct procedure. The patient underwent monitored anesthesia care, which was administered by an anesthesia professional. The patient's blood pressure, ECG and oxygen were monitored throughout the procedure. The scope was introduced through the mouth and advanced to the second part of the duodenum. Retroflexion was performed in the cardia, fundus and incisura. Prior to the procedure, the patient's H. Pylori status was unknown. The patient experienced no blood loss. The procedure was not difficult. The patient tolerated the procedure well. There were no apparent adverse events. ANESTHESIA INFORMATION: ASA: II Anesthesia Type: IV Sedation with Anesthesia MEDICATIONS: lactated ringers infusion 400 mL*  *From user-documented volume (Totals for administrations occurring from 0926 to 0959 on 10/17/24) FINDINGS: Mild erythematous and granular mucosa in the stomach, consistent with gastritis; performed cold forceps biopsy to rule out H. pylori Ectopic gastric mucosa in the upper third of the esophagus Previous fundoplication SPECIMENS: ID Type Source Tests Collected by Time Destination 1 : gastric bx r/o h pylori Tissue  Stomach TISSUE EXAM Robert Ibrahim DO 10/17/2024  9:38 AM      Impression: Mild erythematous, granular mucosa, consistent with gastritis in the stomach; performed cold forceps biopsy Ectopic gastric mucosa in the upper third of the esophagus Previous fundoplication RECOMMENDATION: Await pathology results   Robert Ibrahim DO

## 2025-02-11 ENCOUNTER — TELEPHONE (OUTPATIENT)
Age: 50
End: 2025-02-11

## 2025-02-11 ENCOUNTER — HOSPITAL ENCOUNTER (OUTPATIENT)
Dept: NUCLEAR MEDICINE | Facility: HOSPITAL | Age: 50
Discharge: HOME | End: 2025-02-11
Attending: INTERNAL MEDICINE
Payer: COMMERCIAL

## 2025-02-11 DIAGNOSIS — Z98.890 STATUS POST LAPAROSCOPIC FUNDOPLICATION: ICD-10-CM

## 2025-02-11 PROCEDURE — 78264 GASTRIC EMPTYING IMG STUDY: CPT

## 2025-02-11 PROCEDURE — A9541 TC99M SULFUR COLLOID: HCPCS

## 2025-02-11 NOTE — TELEPHONE ENCOUNTER
Pt called to ask where his gastric emptying test is being done today. I let the pt know at [facility] pt had no further questions

## 2025-02-12 ENCOUNTER — RESULTS FOLLOW-UP (OUTPATIENT)
Dept: GASTROENTEROLOGY | Facility: CLINIC | Age: 50
End: 2025-02-12

## 2025-02-13 NOTE — TELEPHONE ENCOUNTER
----- Message from Nita PLASCENCIA sent at 2/13/2025  8:26 AM EST -----  LVM advising patient of results and need for appointment.    Per Dr Ibrahim-pt is to follow up with him in 2 months